# Patient Record
Sex: FEMALE | Race: WHITE | NOT HISPANIC OR LATINO | Employment: FULL TIME | URBAN - METROPOLITAN AREA
[De-identification: names, ages, dates, MRNs, and addresses within clinical notes are randomized per-mention and may not be internally consistent; named-entity substitution may affect disease eponyms.]

---

## 2023-04-07 PROBLEM — Z00.00 HEALTHCARE MAINTENANCE: Status: ACTIVE | Noted: 2023-04-07

## 2023-04-07 ASSESSMENT — ENCOUNTER SYMPTOMS
ACTIVITY CHANGE: 0
SHORTNESS OF BREATH: 0
CHEST TIGHTNESS: 0
PALPITATIONS: 0
APPETITE CHANGE: 0
NAUSEA: 0
BLOOD IN STOOL: 0
VOMITING: 0
DIARRHEA: 0
UNEXPECTED WEIGHT CHANGE: 0
ABDOMINAL PAIN: 0
CONSTIPATION: 0

## 2023-04-07 NOTE — PROGRESS NOTES
Subjective   Patient ID: Desiree Crum is a 52 y.o. female who presents for Annual Exam (Sees GYN for exams.  She is fasting for blood work today).    HPI     52 year-old female presents for a physical.    last visit with me 10/2021    used to see gyn dr thomason for exams.    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   had some spotting in 2021 so had a pelvic US 11/2021 which showed fibroid but still recommended fu with gyn re: PMB- saw her last yr and had D&C- neg per pt  No further bleeding     last mammo 11/2022 -neg;   sees dr krause -breast specialist at [facility]     hx of HLD/low WBC on prior labs     BMI 31  exercises- walking   tries to eat a healthy diet     11/2020 colonoscopy +polyps; f/u 2 yrs per pt GI sayda  Denies any changes in bowel habits, abdominal pain, diarrhea, constipation, blood in stool, melena.      Tetanus-2019   Flu shot-had  shingrix- had #1 at [employer] shots- had     wears contacts/glasses. and sees optho.   She has seen her dentist for regular cleanings   She denies any chest pains, palpitations, edema, shortness of breath,reflux, nausea, vomiting, diarrhea, constipation, blood in stool, melena.   had thyroid US 2016-showed multinodular goiter- recommend f/u 1 yr;   pt denies any neck changes.   saw endo for f/u and had f/u US which was stable per pt; no biopsy was done  Uses to see endo dr khan at [facility] - will contact to see if due for any fu    Derm for skin checks.  No hx skin CA    hx allergies- takes allegra prn  hx migraines-uses maxalt prn. (Was on relpax in past but had to switch due to insurance) no known triggers  Gets 6-7 migraines/yr          Review of Systems   Constitutional:  Negative for activity change, appetite change and unexpected weight change.   Respiratory:  Negative for chest tightness and shortness of breath.    Cardiovascular:  Negative for chest pain, palpitations and leg swelling.   Gastrointestinal:  Negative for  "abdominal pain, blood in stool, constipation, diarrhea, nausea and vomiting.       Objective   /80   Pulse 70   Temp 36.8 °C (98.3 °F)   Ht 1.626 m (5' 4\")   Wt 81.9 kg (180 lb 9.6 oz)   BMI 31.00 kg/m²     Physical Exam  Vitals and nursing note reviewed.   Constitutional:       Appearance: Normal appearance. She is normal weight.   HENT:      Head: Normocephalic and atraumatic.      Right Ear: Tympanic membrane, ear canal and external ear normal.      Left Ear: Tympanic membrane, ear canal and external ear normal.      Nose: Nose normal.      Mouth/Throat:      Mouth: Mucous membranes are moist.      Pharynx: Oropharynx is clear.   Eyes:      Extraocular Movements: Extraocular movements intact.      Conjunctiva/sclera: Conjunctivae normal.      Pupils: Pupils are equal, round, and reactive to light.   Cardiovascular:      Rate and Rhythm: Normal rate and regular rhythm.   Pulmonary:      Effort: Pulmonary effort is normal.      Breath sounds: Normal breath sounds.   Abdominal:      General: Abdomen is flat. Bowel sounds are normal.      Palpations: Abdomen is soft.   Musculoskeletal:         General: Normal range of motion.      Cervical back: Neck supple.   Skin:     General: Skin is warm and dry.   Neurological:      General: No focal deficit present.      Mental Status: She is alert and oriented to person, place, and time.   Psychiatric:         Mood and Affect: Mood normal.         Behavior: Behavior normal.         Thought Content: Thought content normal.         Judgment: Judgment normal.         Assessment/Plan   Problem List Items Addressed This Visit          Other    Healthcare maintenance - Primary    Migraine     Other Visit Diagnoses       Dizziness            Sees gyn for exams and  breast specialist for mammos  flu shot - had  had covid shots  Tdap 2019  shingrix - had #1 at pharm and will fu for #2  healthy lifestyle-diet, exercise.   f/u with endo re: thyroid nodules.  11/2020 " colonoscopy +polyps; f/u 2 yrs per pt - will fu with GI sayda  check labs  Sees derm for skin checks  Rx refills sent

## 2023-04-10 PROBLEM — N95.0 POSTMENOPAUSAL BLEEDING: Status: ACTIVE | Noted: 2023-04-10

## 2023-04-10 PROBLEM — G43.909 MIGRAINE: Status: ACTIVE | Noted: 2023-04-10

## 2023-04-10 PROBLEM — R32 URINARY INCONTINENCE: Status: RESOLVED | Noted: 2023-04-10 | Resolved: 2023-04-10

## 2023-04-10 PROBLEM — J30.2 ALLERGIC RHINITIS, SEASONAL: Status: RESOLVED | Noted: 2023-04-10 | Resolved: 2023-04-10

## 2023-04-10 PROBLEM — E78.5 HLD (HYPERLIPIDEMIA): Status: ACTIVE | Noted: 2023-04-10

## 2023-04-10 PROBLEM — Z86.0100 HISTORY OF COLON POLYPS: Status: RESOLVED | Noted: 2023-04-10 | Resolved: 2023-04-10

## 2023-04-10 PROBLEM — Z86.010 HISTORY OF COLON POLYPS: Status: RESOLVED | Noted: 2023-04-10 | Resolved: 2023-04-10

## 2023-04-10 PROBLEM — R42 VERTIGO: Status: ACTIVE | Noted: 2023-04-10

## 2023-04-10 PROBLEM — N91.2 AMENORRHEA: Status: RESOLVED | Noted: 2023-04-10 | Resolved: 2023-04-10

## 2023-04-10 PROBLEM — D72.819 LEUKOPENIA: Status: ACTIVE | Noted: 2023-04-10

## 2023-04-10 PROBLEM — E04.2 MULTINODULAR GOITER: Status: ACTIVE | Noted: 2023-04-10

## 2023-04-10 RX ORDER — FEXOFENADINE HCL AND PSEUDOEPHEDRINE HCI 60; 120 MG/1; MG/1
TABLET, EXTENDED RELEASE ORAL
COMMUNITY
Start: 2015-11-03

## 2023-04-10 RX ORDER — RIZATRIPTAN BENZOATE 10 MG/1
TABLET ORAL
COMMUNITY
Start: 2021-10-25 | End: 2023-04-11 | Stop reason: SDUPTHER

## 2023-04-10 RX ORDER — ELETRIPTAN HYDROBROMIDE 40 MG/1
40 TABLET, FILM COATED ORAL
COMMUNITY
Start: 2014-04-25 | End: 2023-04-11

## 2023-04-11 ENCOUNTER — OFFICE VISIT (OUTPATIENT)
Dept: PRIMARY CARE | Facility: CLINIC | Age: 53
End: 2023-04-11
Payer: COMMERCIAL

## 2023-04-11 VITALS
WEIGHT: 180.6 LBS | DIASTOLIC BLOOD PRESSURE: 80 MMHG | TEMPERATURE: 98.3 F | HEART RATE: 70 BPM | HEIGHT: 64 IN | BODY MASS INDEX: 30.83 KG/M2 | SYSTOLIC BLOOD PRESSURE: 122 MMHG

## 2023-04-11 DIAGNOSIS — G43.909 MIGRAINE WITHOUT STATUS MIGRAINOSUS, NOT INTRACTABLE, UNSPECIFIED MIGRAINE TYPE: ICD-10-CM

## 2023-04-11 DIAGNOSIS — Z00.00 HEALTHCARE MAINTENANCE: Primary | ICD-10-CM

## 2023-04-11 DIAGNOSIS — R42 DIZZINESS: ICD-10-CM

## 2023-04-11 LAB
NON-UH HIE A/G RATIO: 1.4
NON-UH HIE ALB: 4.2 G/DL (ref 3.4–5)
NON-UH HIE ALK PHOS: 52 UNIT/L (ref 46–116)
NON-UH HIE BILIRUBIN, TOTAL: 0.7 MG/DL (ref 0.2–1)
NON-UH HIE BUN/CREAT RATIO: 16.7
NON-UH HIE BUN: 15 MG/DL (ref 9–23)
NON-UH HIE CALCIUM: 9.9 MG/DL (ref 8.7–10.4)
NON-UH HIE CALCULATED LDL CHOLESTEROL: 109 MG/DL (ref 60–130)
NON-UH HIE CALCULATED OSMOLALITY: 280 MOSM/KG (ref 275–295)
NON-UH HIE CHLORIDE: 106 MMOL/L (ref 98–107)
NON-UH HIE CHOLESTEROL: 234 MG/DL (ref 100–200)
NON-UH HIE CO2, VENOUS: 27 MMOL/L (ref 20–31)
NON-UH HIE CREATININE: 0.9 MG/DL (ref 0.5–0.8)
NON-UH HIE GFR AA: >60
NON-UH HIE GLOBULIN: 2.9 G/DL
NON-UH HIE GLOMERULAR FILTRATION RATE: >60 ML/MIN/1.73M?
NON-UH HIE GLUCOSE: 95 MG/DL (ref 74–106)
NON-UH HIE GOT: 21 UNIT/L (ref 15–37)
NON-UH HIE GPT: 24 UNIT/L (ref 10–49)
NON-UH HIE HCT: 42.9 % (ref 36–46)
NON-UH HIE HDL CHOLESTEROL: 104 MG/DL (ref 40–60)
NON-UH HIE HGB A1C: 5.1 %
NON-UH HIE HGB: 14.3 G/DL (ref 12–16)
NON-UH HIE INSTR WBC ND: 5.9
NON-UH HIE K: 4.4 MMOL/L (ref 3.5–5.1)
NON-UH HIE MCH: 29 PG (ref 27–34)
NON-UH HIE MCHC: 33.3 G/DL (ref 32–37)
NON-UH HIE MCV: 87.1 FL (ref 80–100)
NON-UH HIE MPV: 8.7 FL (ref 7.4–10.4)
NON-UH HIE NA: 140 MMOL/L (ref 135–145)
NON-UH HIE PLATELET: 192 X10 (ref 150–450)
NON-UH HIE RBC: 4.93 X10 (ref 4.2–5.4)
NON-UH HIE RDW: 13.9 % (ref 11.5–14.5)
NON-UH HIE TOTAL CHOL/HDL CHOL RATIO: 2.2
NON-UH HIE TOTAL PROTEIN: 7.1 G/DL (ref 5.7–8.2)
NON-UH HIE TRIGLYCERIDES: 106 MG/DL (ref 30–150)
NON-UH HIE TSH: 1.47 UIU/ML (ref 0.55–4.78)
NON-UH HIE VIT D 25: 60 NG/ML
NON-UH HIE WBC: 5.9 X10 (ref 4.5–11)

## 2023-04-11 PROCEDURE — 99396 PREV VISIT EST AGE 40-64: CPT | Performed by: FAMILY MEDICINE

## 2023-04-11 PROCEDURE — 1036F TOBACCO NON-USER: CPT | Performed by: FAMILY MEDICINE

## 2023-04-11 RX ORDER — MECLIZINE HCL 12.5 MG 12.5 MG/1
12.5 TABLET ORAL EVERY 8 HOURS PRN
Qty: 30 TABLET | Refills: 1 | Status: SHIPPED | OUTPATIENT
Start: 2023-04-11

## 2023-04-11 RX ORDER — RIZATRIPTAN BENZOATE 10 MG/1
10 TABLET ORAL ONCE AS NEEDED
Qty: 9 TABLET | Refills: 2 | Status: SHIPPED | OUTPATIENT
Start: 2023-04-11 | End: 2023-06-29

## 2023-04-11 RX ORDER — MECLIZINE HCL 12.5 MG 12.5 MG/1
12.5 TABLET ORAL EVERY 8 HOURS PRN
COMMUNITY
Start: 2020-09-16 | End: 2023-04-11 | Stop reason: SDUPTHER

## 2023-04-11 RX ORDER — ELETRIPTAN HYDROBROMIDE 40 MG/1
40 TABLET, FILM COATED ORAL ONCE AS NEEDED
Qty: 6 TABLET | Refills: 3 | Status: CANCELLED | OUTPATIENT
Start: 2023-04-11

## 2023-04-11 ASSESSMENT — PATIENT HEALTH QUESTIONNAIRE - PHQ9
2. FEELING DOWN, DEPRESSED OR HOPELESS: NOT AT ALL
SUM OF ALL RESPONSES TO PHQ9 QUESTIONS 1 AND 2: 0
1. LITTLE INTEREST OR PLEASURE IN DOING THINGS: NOT AT ALL

## 2023-04-11 ASSESSMENT — PROMIS GLOBAL HEALTH SCALE
RATE_AVERAGE_PAIN: 2
RATE_AVERAGE_FATIGUE: MILD
RATE_PHYSICAL_HEALTH: VERY GOOD
CARRYOUT_PHYSICAL_ACTIVITIES: COMPLETELY
RATE_SOCIAL_SATISFACTION: VERY GOOD
RATE_GENERAL_HEALTH: VERY GOOD
RATE_QUALITY_OF_LIFE: VERY GOOD
RATE_MENTAL_HEALTH: VERY GOOD
EMOTIONAL_PROBLEMS: RARELY
CARRYOUT_SOCIAL_ACTIVITIES: VERY GOOD

## 2023-04-13 ENCOUNTER — TELEPHONE (OUTPATIENT)
Dept: PRIMARY CARE | Facility: CLINIC | Age: 53
End: 2023-04-13
Payer: COMMERCIAL

## 2023-04-13 NOTE — TELEPHONE ENCOUNTER
----- Message from Pratibha Baxter DO sent at 4/12/2023  5:06 PM EDT -----  Please let pt know that her vitamin D, blood counts, sugar, electrolytes, thyroid, liver function are all normal. Her cholesterol was slightly elevated but her HDL or good cholesterol was very good.  Her kidney function was slightly elevated at 0.9 (should be less than 0.8).  We will continue to monitor.    ----- Message -----  From: Damaris Harris  Sent: 4/12/2023  11:42 AM EDT  To: Pratibha Baxter DO

## 2023-06-29 DIAGNOSIS — G43.909 MIGRAINE WITHOUT STATUS MIGRAINOSUS, NOT INTRACTABLE, UNSPECIFIED MIGRAINE TYPE: ICD-10-CM

## 2023-06-29 RX ORDER — RIZATRIPTAN BENZOATE 10 MG/1
10 TABLET ORAL ONCE AS NEEDED
Qty: 9 TABLET | Refills: 2 | Status: SHIPPED | OUTPATIENT
Start: 2023-06-29 | End: 2024-02-05

## 2023-11-08 ASSESSMENT — PROMIS GLOBAL HEALTH SCALE
RATE_SOCIAL_SATISFACTION: VERY GOOD
RATE_MENTAL_HEALTH: VERY GOOD
RATE_PHYSICAL_HEALTH: VERY GOOD
EMOTIONAL_PROBLEMS: NEVER
RATE_AVERAGE_PAIN: 2
RATE_QUALITY_OF_LIFE: VERY GOOD
CARRYOUT_PHYSICAL_ACTIVITIES: COMPLETELY
RATE_AVERAGE_FATIGUE: MILD
CARRYOUT_SOCIAL_ACTIVITIES: EXCELLENT
RATE_GENERAL_HEALTH: VERY GOOD

## 2023-11-14 ENCOUNTER — APPOINTMENT (OUTPATIENT)
Dept: PRIMARY CARE | Facility: CLINIC | Age: 53
End: 2023-11-14
Payer: COMMERCIAL

## 2024-01-05 DIAGNOSIS — T75.3XXD MOTION SICKNESS, SUBSEQUENT ENCOUNTER: ICD-10-CM

## 2024-01-05 RX ORDER — SCOLOPAMINE TRANSDERMAL SYSTEM 1 MG/1
1 PATCH, EXTENDED RELEASE TRANSDERMAL
Qty: 10 PATCH | Refills: 0 | Status: SHIPPED | OUTPATIENT
Start: 2024-01-05 | End: 2024-03-05

## 2024-02-04 DIAGNOSIS — G43.909 MIGRAINE WITHOUT STATUS MIGRAINOSUS, NOT INTRACTABLE, UNSPECIFIED MIGRAINE TYPE: ICD-10-CM

## 2024-02-05 RX ORDER — RIZATRIPTAN BENZOATE 10 MG/1
10 TABLET ORAL ONCE AS NEEDED
Qty: 9 TABLET | Refills: 2 | Status: SHIPPED | OUTPATIENT
Start: 2024-02-05 | End: 2024-03-14 | Stop reason: SDUPTHER

## 2024-02-14 NOTE — PROGRESS NOTES
"Subjective   Patient ID: Desiree Crum is a 53 y.o. female who presents for Earache (Was seen at urgent care and given antibiotic drops, still blocked.  No pain right now).    HPI   52 yo female presents for fu recent ear infection  Last seen 4/2023 for physical    Was on a cruise a few weeks ago and had a URI with sinus congestion, pressure, thick drainage.  Started to get left ear pressure which worsened significantly after her flight home.  No drainage from the ear.  Went to [facility] and was given prescription for Omnicef and an eardrop.  She feels better but not 100%.  Still with some congestion and drainage and left ear pressure.  Denies any ear pain more just pressure and muffled hearing.  No fevers or chills.  Has been using Flonase.    Review of Systems  ROS as stated in HPI    Objective   /68   Pulse 66   Temp 36.7 °C (98.1 °F)   Ht 1.626 m (5' 4\")   Wt 82.2 kg (181 lb 3.2 oz)   BMI 31.10 kg/m²     Physical Exam  Constitutional: A&O; NAD  HEENT: conjunctiva normal. EOMI; PERRLA; lids normal; left TM with clear fluid;   Neck: supple; No lymphadenopathy   Heart: RRR     Lungs: CTA bilateral   Neuro: No gross neuro deficits     Psych: Judgment, orientation, mood, affect, insight wnl   Assessment/Plan   Problem List Items Addressed This Visit    None  Visit Diagnoses         Codes    Sinusitis, unspecified chronicity, unspecified location    -  Primary J32.9    Relevant Medications    cefdinir (Omnicef) 300 mg capsule        Supportive treatment.  Will treat with another course of Omnicef.  Continue Flonase and use decongestions as needed.  Follow-up if persists or worsens       "

## 2024-02-15 ENCOUNTER — OFFICE VISIT (OUTPATIENT)
Dept: PRIMARY CARE | Facility: CLINIC | Age: 54
End: 2024-02-15
Payer: COMMERCIAL

## 2024-02-15 VITALS
TEMPERATURE: 98.1 F | SYSTOLIC BLOOD PRESSURE: 126 MMHG | DIASTOLIC BLOOD PRESSURE: 68 MMHG | WEIGHT: 181.2 LBS | BODY MASS INDEX: 30.93 KG/M2 | HEIGHT: 64 IN | HEART RATE: 66 BPM

## 2024-02-15 DIAGNOSIS — J32.9 SINUSITIS, UNSPECIFIED CHRONICITY, UNSPECIFIED LOCATION: Primary | ICD-10-CM

## 2024-02-15 PROCEDURE — 99213 OFFICE O/P EST LOW 20 MIN: CPT | Performed by: FAMILY MEDICINE

## 2024-02-15 PROCEDURE — 1036F TOBACCO NON-USER: CPT | Performed by: FAMILY MEDICINE

## 2024-02-15 RX ORDER — CEFDINIR 300 MG/1
300 CAPSULE ORAL 2 TIMES DAILY
Qty: 20 CAPSULE | Refills: 0 | Status: SHIPPED | OUTPATIENT
Start: 2024-02-15 | End: 2024-02-25

## 2024-02-15 ASSESSMENT — PATIENT HEALTH QUESTIONNAIRE - PHQ9
1. LITTLE INTEREST OR PLEASURE IN DOING THINGS: NOT AT ALL
SUM OF ALL RESPONSES TO PHQ9 QUESTIONS 1 AND 2: 0
2. FEELING DOWN, DEPRESSED OR HOPELESS: NOT AT ALL

## 2024-03-12 ASSESSMENT — PROMIS GLOBAL HEALTH SCALE
RATE_QUALITY_OF_LIFE: VERY GOOD
CARRYOUT_SOCIAL_ACTIVITIES: VERY GOOD
RATE_SOCIAL_SATISFACTION: VERY GOOD
CARRYOUT_PHYSICAL_ACTIVITIES: COMPLETELY
RATE_MENTAL_HEALTH: VERY GOOD
RATE_GENERAL_HEALTH: VERY GOOD
RATE_AVERAGE_FATIGUE: MILD
RATE_AVERAGE_PAIN: 1
EMOTIONAL_PROBLEMS: NEVER
RATE_PHYSICAL_HEALTH: VERY GOOD

## 2024-03-13 NOTE — PROGRESS NOTES
"Subjective   Patient ID: Desiree Crum is a 53 y.o. female who presents for Annual Exam (She is fasting for blood work.  Sees GYN for exams. ).    HPI   53year-old female presents for a physical.       last pap was here 10/2021 was negative but +other HPV  LMP at 40yo after an ablation.   had some spotting in 2021 so had a pelvic US 11/2021 which showed fibroid but still recommended fu with gyn re: PMB- saw gyn had D&C 11/2022- neg per pt  No further bleeding  Plans to fu with gyn for exam   gyn paddy    last mammo 11/2023 -neg;   sees dr krause -breast specialist at [facility]     hx of HLD   BMI 30   exercises- walking   tries to eat a healthy diet     11/2020 colonoscopy +polyps; f/u 2 yrs per pt GI sayda  Denies any changes in bowel habits, abdominal pain, diarrhea, constipation, blood in stool, melena.      Tetanus-2019   Flu shot-had  shingrix- had  covid shots- had     wears contacts/glasses. and sees optho.   She has seen her dentist for regular cleanings   She denies any chest pains, palpitations, edema, shortness of breath,reflux, nausea, vomiting, diarrhea, constipation, blood in stool, melena.     Hx thyroid nodules- endo dr khan at [facility]   6/2023 US- recommended yearly US on report but pt   No neck changes.     Sees derm for skin checks.  hx skin CA- left breast- pt not sure what type     hx migraines-uses maxalt prn. (Was on relpax in past but had to switch due to insurance) no known triggers  Gets 6-7 migraines/yr       Review of Systems  ROS as stated in HPI    Objective   /74   Pulse 63   Temp 36.5 °C (97.7 °F)   Ht 1.626 m (5' 4\")   Wt 80.3 kg (177 lb)   BMI 30.38 kg/m²     Physical Exam  Constitutional: A&O; NAD  HEENT: conjunctiva normal. EOMI; PERRLA; lids normal; TM's clear;   Neck: supple; No lymphadenopathy   Heart: RRR     Lungs: CTA bilateral   Abd: Soft, Nontender, Nondistended  Ext:  No edema;    Neuro: No gross neuro deficits     Psych: Judgment, orientation, mood, affect, insight " wnl   Assessment/Plan   Problem List Items Addressed This Visit             ICD-10-CM    Healthcare maintenance - Primary Z00.00    Relevant Orders    CBC    Comprehensive Metabolic Panel    Hemoglobin A1C    Lipid Panel    TSH with reflex to Free T4 if abnormal    Vitamin D 25-Hydroxy,Total (for eval of Vitamin D levels)    Migraine G43.909    Relevant Medications    rizatriptan (Maxalt) 10 mg tablet     Fu with gyn for exam and  breast specialist for mammos  flu shot - had  had covid shots  Tdap 2019  shingrix - had   healthy lifestyle-diet, exercise.   f/u with endo re: thyroid nodules.  11/2020 colonoscopy +polyps; f/u 2 yrs per pt - will fu with GI sayda  check labs  Sees derm for skin checks

## 2024-03-14 ENCOUNTER — OFFICE VISIT (OUTPATIENT)
Dept: PRIMARY CARE | Facility: CLINIC | Age: 54
End: 2024-03-14
Payer: COMMERCIAL

## 2024-03-14 VITALS
DIASTOLIC BLOOD PRESSURE: 74 MMHG | HEIGHT: 64 IN | SYSTOLIC BLOOD PRESSURE: 124 MMHG | TEMPERATURE: 97.7 F | WEIGHT: 177 LBS | HEART RATE: 63 BPM | BODY MASS INDEX: 30.22 KG/M2

## 2024-03-14 DIAGNOSIS — G43.909 MIGRAINE WITHOUT STATUS MIGRAINOSUS, NOT INTRACTABLE, UNSPECIFIED MIGRAINE TYPE: ICD-10-CM

## 2024-03-14 DIAGNOSIS — Z00.00 HEALTHCARE MAINTENANCE: Primary | ICD-10-CM

## 2024-03-14 LAB
NON-UH HIE A/G RATIO: 1.4
NON-UH HIE ALB: 4.3 G/DL (ref 3.4–5)
NON-UH HIE ALK PHOS: 49 UNIT/L (ref 45–117)
NON-UH HIE BILIRUBIN, TOTAL: 0.7 MG/DL (ref 0.3–1.2)
NON-UH HIE BUN/CREAT RATIO: 20
NON-UH HIE BUN: 18 MG/DL (ref 9–23)
NON-UH HIE CALCIUM: 10.1 MG/DL (ref 8.7–10.4)
NON-UH HIE CALCULATED LDL CHOLESTEROL: 106 MG/DL (ref 60–130)
NON-UH HIE CALCULATED OSMOLALITY: 283 MOSM/KG (ref 275–295)
NON-UH HIE CHLORIDE: 107 MMOL/L (ref 98–107)
NON-UH HIE CHOLESTEROL: 226 MG/DL (ref 100–200)
NON-UH HIE CO2, VENOUS: 29 MMOL/L (ref 20–31)
NON-UH HIE CREATININE: 0.9 MG/DL (ref 0.5–0.8)
NON-UH HIE GFR AA: >60
NON-UH HIE GLOBULIN: 3.1 G/DL
NON-UH HIE GLOMERULAR FILTRATION RATE: >60 ML/MIN/1.73M?
NON-UH HIE GLUCOSE: 90 MG/DL (ref 74–106)
NON-UH HIE GOT: 25 UNIT/L (ref 15–37)
NON-UH HIE GPT: 22 UNIT/L (ref 10–49)
NON-UH HIE HCT: 42.6 % (ref 36–46)
NON-UH HIE HDL CHOLESTEROL: 90 MG/DL (ref 40–60)
NON-UH HIE HGB A1C: 5.3 %
NON-UH HIE HGB: 14.5 G/DL (ref 12–16)
NON-UH HIE INSTR WBC ND: 5.1
NON-UH HIE K: 4.5 MMOL/L (ref 3.5–5.1)
NON-UH HIE MCH: 28.7 PG (ref 27–34)
NON-UH HIE MCHC: 33.9 G/DL (ref 32–37)
NON-UH HIE MCV: 84.5 FL (ref 80–100)
NON-UH HIE MPV: 8.9 FL (ref 7.4–10.4)
NON-UH HIE NA: 141 MMOL/L (ref 135–145)
NON-UH HIE PLATELET: 220 X10 (ref 150–450)
NON-UH HIE RBC: 5.04 X10 (ref 4.2–5.4)
NON-UH HIE RDW: 13.9 % (ref 11.5–14.5)
NON-UH HIE TOTAL CHOL/HDL CHOL RATIO: 2.5
NON-UH HIE TOTAL PROTEIN: 7.4 G/DL (ref 5.7–8.2)
NON-UH HIE TRIGLYCERIDES: 150 MG/DL (ref 30–150)
NON-UH HIE TSH: 1.15 UIU/ML (ref 0.55–4.78)
NON-UH HIE VIT D 25: 66 NG/ML
NON-UH HIE WBC: 5.1 X10 (ref 4.5–11)

## 2024-03-14 PROCEDURE — 99396 PREV VISIT EST AGE 40-64: CPT | Performed by: FAMILY MEDICINE

## 2024-03-14 PROCEDURE — 1036F TOBACCO NON-USER: CPT | Performed by: FAMILY MEDICINE

## 2024-03-14 RX ORDER — RIZATRIPTAN BENZOATE 10 MG/1
10 TABLET ORAL ONCE AS NEEDED
Qty: 9 TABLET | Refills: 6 | Status: SHIPPED | OUTPATIENT
Start: 2024-03-14

## 2024-03-14 ASSESSMENT — PATIENT HEALTH QUESTIONNAIRE - PHQ9
1. LITTLE INTEREST OR PLEASURE IN DOING THINGS: NOT AT ALL
2. FEELING DOWN, DEPRESSED OR HOPELESS: NOT AT ALL
SUM OF ALL RESPONSES TO PHQ9 QUESTIONS 1 AND 2: 0

## 2024-03-15 ENCOUNTER — TELEPHONE (OUTPATIENT)
Dept: PRIMARY CARE | Facility: CLINIC | Age: 54
End: 2024-03-15
Payer: COMMERCIAL

## 2024-03-15 NOTE — TELEPHONE ENCOUNTER
----- Message from Pratibha Baxter DO sent at 3/14/2024  8:34 PM EDT -----  Please let pt know that her vitamin D, blood counts, sugar, electrolytes, thyroid, and liver function are all normal.   Her cholesterol was slightly elevated.  notify them of all the lipid #s.  I recommend a healthy diet and exercise and we will cont to monitor.  Her kidney function was slightly elevated at 0.9(should be less than 0.8).  We will continue to monitor.

## 2025-01-10 LAB — NON-UH HIE GP HPV: NEGATIVE

## 2025-01-13 LAB — NON-UH HIE THINPREP TIS PAP: NORMAL

## 2025-06-09 DIAGNOSIS — G43.909 MIGRAINE WITHOUT STATUS MIGRAINOSUS, NOT INTRACTABLE, UNSPECIFIED MIGRAINE TYPE: ICD-10-CM

## 2025-06-10 RX ORDER — RIZATRIPTAN BENZOATE 10 MG/1
10 TABLET ORAL ONCE AS NEEDED
Qty: 9 TABLET | Refills: 3 | Status: SHIPPED | OUTPATIENT
Start: 2025-06-10

## 2025-07-20 NOTE — PROGRESS NOTES
"Subjective   Patient ID: Desiree Crum is a 54 y.o. female who presents for Annual Exam (She is fasting for blood work today.  Sees GYN for exams. ).    HPI    54 year-old female presents for a physical.      LMP at 38yo after an ablation.   had some spotting in 2021 so had a pelvic US 11/2021 which showed fibroid but still recommended fu with gyn re: PMB- saw gyn had D&C 11/2022- neg per pt  saw gyn paddy 1/2025 for pap  now on hormones for hot flashes    sees -breast specialist at [facility] for mammos   11/2024 mammo-neg    hx of HLD   BMI 30   exercises- walking   tries to eat a healthy diet     8/2024 colonoscopy +polyps; f/u 2 yrs per pt   GI sayda  Denies any changes in bowel habits, abdominal pain, diarrhea, constipation, blood in stool, melena.      Tetanus-2019   Flu shot-had  shingrix- had  covid shots- had     wears contacts/glasses. and sees optho.   She has seen her dentist for regular cleanings   She denies any chest pains, palpitations, edema, shortness of breath,reflux, nausea, vomiting, diarrhea, constipation, blood in stool, melena.     Hx thyroid nodules- endo dr khan [facility]   6/2023 US- recommended yearly US x 5 yrs on report but pt reports she was told she can come back in a few yrs to repeat.   no neck changes.    Sees derm for skin checks.  hx skin CA- left breast- pt not sure what type     hx migraines-uses maxalt prn.  no known triggers  does not get very often    her daughter just got  in [facility] and her son is also getting  this yr        Review of Systems  ROS as stated in HPI    Objective   /74   Pulse 64   Temp 36.8 °C (98.3 °F)   Ht 1.626 m (5' 4\")   Wt 80.8 kg (178 lb 3.2 oz)   BMI 30.59 kg/m²     Physical Exam  Constitutional: A&O; NAD  HEENT: conjunctiva normal. EOMI; PERRLA; lids normal; TM's clear;   Neck: supple; No lymphadenopathy   Heart: RRR     Lungs: CTA bilateral   Abd: Soft, Nontender, Nondistended  Ext:  No edema;    Neuro: No gross neuro " deficits     Psych: Judgment, orientation, mood, affect, insight wnl   Assessment/Plan   Problem List Items Addressed This Visit           ICD-10-CM    Healthcare maintenance - Primary Z00.00    Relevant Orders    CBC    Comprehensive Metabolic Panel    Hemoglobin A1C    Lipid Panel    TSH with reflex to Free T4 if abnormal    Migraine G43.909    Relevant Medications    rizatriptan (Maxalt) 10 mg tablet     Other Visit Diagnoses         Codes      Dizziness     R42    Relevant Medications    meclizine (Antivert) 12.5 mg tablet          sees gyn for exams and  breast specialist for mammos  flu shot in fall   had covid shots  Tdap 2019  shingrix - had   healthy lifestyle-diet, exercise.   f/u with endo/ENT re: thyroid nodules- will try to get note from endo with their recommendations   8/2024 colonoscopy +polyps; f/u 2 yrs per pt   check labs  Sees derm for skin checks

## 2025-07-22 ENCOUNTER — TELEPHONE (OUTPATIENT)
Dept: PRIMARY CARE | Facility: CLINIC | Age: 55
End: 2025-07-22

## 2025-07-22 ENCOUNTER — APPOINTMENT (OUTPATIENT)
Dept: PRIMARY CARE | Facility: CLINIC | Age: 55
End: 2025-07-22
Payer: COMMERCIAL

## 2025-07-22 VITALS
HEART RATE: 64 BPM | WEIGHT: 178.2 LBS | TEMPERATURE: 98.3 F | SYSTOLIC BLOOD PRESSURE: 107 MMHG | DIASTOLIC BLOOD PRESSURE: 74 MMHG | HEIGHT: 64 IN | BODY MASS INDEX: 30.42 KG/M2

## 2025-07-22 DIAGNOSIS — R42 DIZZINESS: ICD-10-CM

## 2025-07-22 DIAGNOSIS — Z00.00 HEALTHCARE MAINTENANCE: Primary | ICD-10-CM

## 2025-07-22 DIAGNOSIS — G43.909 MIGRAINE WITHOUT STATUS MIGRAINOSUS, NOT INTRACTABLE, UNSPECIFIED MIGRAINE TYPE: ICD-10-CM

## 2025-07-22 PROCEDURE — 1036F TOBACCO NON-USER: CPT | Performed by: FAMILY MEDICINE

## 2025-07-22 PROCEDURE — 3008F BODY MASS INDEX DOCD: CPT | Performed by: FAMILY MEDICINE

## 2025-07-22 PROCEDURE — 99396 PREV VISIT EST AGE 40-64: CPT | Performed by: FAMILY MEDICINE

## 2025-07-22 RX ORDER — PROGESTERONE 100 MG/1
100 CAPSULE ORAL DAILY
COMMUNITY
Start: 2025-06-09

## 2025-07-22 RX ORDER — ESTRADIOL 0.05 MG/D
FILM, EXTENDED RELEASE TRANSDERMAL
COMMUNITY
Start: 2025-06-10

## 2025-07-22 RX ORDER — MECLIZINE HCL 12.5 MG 12.5 MG/1
12.5 TABLET ORAL EVERY 8 HOURS PRN
Qty: 30 TABLET | Refills: 1 | Status: SHIPPED | OUTPATIENT
Start: 2025-07-22

## 2025-07-22 RX ORDER — RIZATRIPTAN BENZOATE 10 MG/1
10 TABLET ORAL ONCE AS NEEDED
Qty: 9 TABLET | Refills: 3 | Status: SHIPPED | OUTPATIENT
Start: 2025-07-22

## 2025-07-22 ASSESSMENT — PROMIS GLOBAL HEALTH SCALE
CARRYOUT_SOCIAL_ACTIVITIES: VERY GOOD
EMOTIONAL_PROBLEMS: NEVER
RATE_GENERAL_HEALTH: VERY GOOD
RATE_AVERAGE_PAIN: 1
RATE_QUALITY_OF_LIFE: VERY GOOD
CARRYOUT_PHYSICAL_ACTIVITIES: COMPLETELY
RATE_PHYSICAL_HEALTH: VERY GOOD
RATE_AVERAGE_FATIGUE: MILD
RATE_SOCIAL_SATISFACTION: VERY GOOD
RATE_MENTAL_HEALTH: VERY GOOD

## 2025-07-22 ASSESSMENT — PATIENT HEALTH QUESTIONNAIRE - PHQ9
2. FEELING DOWN, DEPRESSED OR HOPELESS: NOT AT ALL
1. LITTLE INTEREST OR PLEASURE IN DOING THINGS: NOT AT ALL
SUM OF ALL RESPONSES TO PHQ9 QUESTIONS 1 AND 2: 0

## 2025-07-23 ENCOUNTER — RESULTS FOLLOW-UP (OUTPATIENT)
Dept: PRIMARY CARE | Facility: CLINIC | Age: 55
End: 2025-07-23
Payer: COMMERCIAL

## 2025-07-23 LAB
ALBUMIN SERPL-MCNC: 4.5 G/DL (ref 3.6–5.1)
ALP SERPL-CCNC: 52 U/L (ref 37–153)
ALT SERPL-CCNC: 17 U/L (ref 6–29)
ANION GAP SERPL CALCULATED.4IONS-SCNC: 10 MMOL/L (CALC) (ref 7–17)
AST SERPL-CCNC: 18 U/L (ref 10–35)
BILIRUB SERPL-MCNC: 0.7 MG/DL (ref 0.2–1.2)
BUN SERPL-MCNC: 23 MG/DL (ref 7–25)
CALCIUM SERPL-MCNC: 9.4 MG/DL (ref 8.6–10.4)
CHLORIDE SERPL-SCNC: 105 MMOL/L (ref 98–110)
CHOLEST SERPL-MCNC: 235 MG/DL
CHOLEST/HDLC SERPL: 2.2 (CALC)
CO2 SERPL-SCNC: 25 MMOL/L (ref 20–32)
CREAT SERPL-MCNC: 0.96 MG/DL (ref 0.5–1.03)
EGFRCR SERPLBLD CKD-EPI 2021: 70 ML/MIN/1.73M2
ERYTHROCYTE [DISTWIDTH] IN BLOOD BY AUTOMATED COUNT: 14.4 % (ref 11–15)
EST. AVERAGE GLUCOSE BLD GHB EST-MCNC: 108 MG/DL
EST. AVERAGE GLUCOSE BLD GHB EST-SCNC: 6 MMOL/L
GLUCOSE SERPL-MCNC: 96 MG/DL (ref 65–99)
HBA1C MFR BLD: 5.4 %
HCT VFR BLD AUTO: 46 % (ref 35–45)
HDLC SERPL-MCNC: 105 MG/DL
HGB BLD-MCNC: 14.7 G/DL (ref 11.7–15.5)
LDLC SERPL CALC-MCNC: 108 MG/DL (CALC)
MCH RBC QN AUTO: 28.8 PG (ref 27–33)
MCHC RBC AUTO-ENTMCNC: 32 G/DL (ref 32–36)
MCV RBC AUTO: 90.2 FL (ref 80–100)
NONHDLC SERPL-MCNC: 130 MG/DL (CALC)
PLATELET # BLD AUTO: 216 THOUSAND/UL (ref 140–400)
PMV BLD REES-ECKER: 11 FL (ref 7.5–12.5)
POTASSIUM SERPL-SCNC: 4.1 MMOL/L (ref 3.5–5.3)
PROT SERPL-MCNC: 7 G/DL (ref 6.1–8.1)
RBC # BLD AUTO: 5.1 MILLION/UL (ref 3.8–5.1)
SODIUM SERPL-SCNC: 140 MMOL/L (ref 135–146)
TRIGL SERPL-MCNC: 116 MG/DL
TSH SERPL-ACNC: 1.1 MIU/L
WBC # BLD AUTO: 4.3 THOUSAND/UL (ref 3.8–10.8)

## 2025-07-24 NOTE — TELEPHONE ENCOUNTER
----- Message from Pratibha Baxter sent at 7/23/2025  4:39 PM EDT -----  Please let pt know that her blood counts, sugar, electrolytes, thyroid, liver, and kidney function are all normal. Her cholesterol was slighlty elevated.  notify them of all the lipid #s.  I   recommend a healthy diet and exercise and we will cont to monitor  ----- Message -----  From: [employer]  Sent: 7/23/2025   7:35 AM EDT  To: Pratibha Baxter, DO

## 2026-07-28 ENCOUNTER — APPOINTMENT (OUTPATIENT)
Dept: PRIMARY CARE | Facility: CLINIC | Age: 56
End: 2026-07-28
Payer: COMMERCIAL